# Patient Record
Sex: MALE | Race: WHITE
[De-identification: names, ages, dates, MRNs, and addresses within clinical notes are randomized per-mention and may not be internally consistent; named-entity substitution may affect disease eponyms.]

---

## 2023-03-06 ENCOUNTER — HOSPITAL ENCOUNTER (OUTPATIENT)
Facility: HOSPITAL | Age: 43
Setting detail: SPECIMEN
Discharge: HOME | End: 2023-03-09
Payer: COMMERCIAL

## 2023-03-06 PROCEDURE — 88304 TISSUE EXAM BY PATHOLOGIST: CPT

## 2023-03-06 PROCEDURE — 88311 DECALCIFY TISSUE: CPT

## 2023-03-06 PROCEDURE — 88305 TISSUE EXAM BY PATHOLOGIST: CPT

## 2024-04-24 ENCOUNTER — TELEPHONE (OUTPATIENT)
Facility: HOSPITAL | Age: 44
End: 2024-04-24

## 2024-04-26 ENCOUNTER — HOSPITAL ENCOUNTER (OUTPATIENT)
Facility: HOSPITAL | Age: 44
Setting detail: RECURRING SERIES
Discharge: HOME | End: 2024-04-29
Payer: COMMERCIAL

## 2024-04-26 PROCEDURE — 97110 THERAPEUTIC EXERCISES: CPT

## 2024-04-26 PROCEDURE — 97161 PT EVAL LOW COMPLEX 20 MIN: CPT

## 2024-04-26 PROCEDURE — 97535 SELF CARE MNGMENT TRAINING: CPT

## 2024-04-26 NOTE — PROGRESS NOTES
PHYSICAL THERAPY Upper Extremity Evaluation/Daily Note     Patient Name: Nasir Dash III    Date: 2024    : 1980  Insurance: Payor: SCHUYLER / Plan: JESS PAYAN FEDERAL / Product Type: *No Product type* /      Patient  verified yes     Visit #   Current / Total 1 16   Time   In / Out 10:52 am 11:30 am   Pain   In / Out 3 3   Subjective Functional Status/Changes: Pt is a 43 year old male presenting with c/o left forearm pain 1.5 months ago after hearing a loud pair of pops in his forearm when trying to break caliper bolts free when working on a truck. States his forearm is uncomfortable when bending his elbow, lifting anything with the left arm, typing all provoke pain. Twisting a doorknob bothers it as well. States pain is improving. States he saw orthopedics and had x-rays unremarkable. No discoloration noted at the time of injury or afterwards. States left arm still feels a little weak.   Changes to:  Meds, Allergies, Med Hx, Sx Hx?  If yes, update Summary List no       TREATMENT AREA =  Pain in left elbow [M25.522]    SUBJECTIVE  PLOF: functionally independent, no AD, semi-active lifestyle  Limitations to PLOF: limited lifting, twisting, bending of left elbow  Mechanism of Injury: see above, acute  Current symptoms/Complaints: 3/10 at best with rest; 7/10 at worst with lifting and twisting; pt reports they are able to sleep through the night secondary to pain  Previous Treatment/Compliance: ortho referral  Imaging/Diagnostic Testing: x-rays unremarkable for left arm  PMHx/Surgical Hx: ulnar nerve transposition  Work Hx: see intake  Living Situation:   Pt Goals: \"Get some exercises to help speed this\"  Barriers: []pain []financial []time []transportation []other  Motivation: appears motivated and cooperative  Substance use: []Alcohol []Tobacco []other:   Cognition: A & O x 3      Medications: none    OBJECTIVE  15 min [x]Eval                  []Re-Eval     Therapeutic Procedures:  Tx Min

## 2024-04-26 NOTE — PROGRESS NOTES
[facility]  [address] [address]  Phone: [phone]   Fax: [phone]    Plan of Care/ Statement of Necessity for Physical Therapy Services        Patient name: Nasir Dash III Start of Care: 2024   Referral source: Michael Joya MD : 1980    Medical Diagnosis: Pain in left elbow [M25.522]      Onset Date:~3/15/24    Treatment Diagnosis:  M25.522  LEFT ELBOW PAIN                                          Prior Hospitalization: see medical history Provider#: [id number]   Medications: Verified on Patient Summary List     Comorbidities:  ulnar nerve transposition   Prior Level of Function: functionally independent, no AD, semi-active lifestyle       The Plan of Care and following information is based on the information from the initial evaluation.  Assessment/ key information: Pt is a 42 yo male who presents to [facility] with c/o left medial forearm and elbow pain onset acutely while straining to move some calipers while changing his brakes at home. Pt presents with sign and symptoms consistent with leeft wrist flexor mass and supinator straing with possible distal biceps tendon involvement as well with associated pain limited weakness with elbow flexion, supination, wrist flexion, and limited AROM with elbow supination and pronation and pain with active wrist flexion with TTP along the medial wrist flexor mass just distal to the elbow. Pt will benefit from skilled PT to address their impairments and facilitate improved functional status.  Evaluation Complexity HistoryLOW Complexity : Zero comorbidities / personal factors that will impact the outcome / POC ; Examination MEDIUM Complexity : 3 Standardized tests and measures addressin body structure, function, activity limitation and / or participation in recreation  ;Presentation LOW Complexity : Stable, uncomplicated  ;Clinical Decision Making MEDIUM Complexity : FOTO score of 26-74 FOTO

## 2024-04-29 ENCOUNTER — HOSPITAL ENCOUNTER (OUTPATIENT)
Facility: HOSPITAL | Age: 44
Setting detail: RECURRING SERIES
Discharge: HOME | End: 2024-05-02
Payer: COMMERCIAL

## 2024-04-29 PROCEDURE — 97112 NEUROMUSCULAR REEDUCATION: CPT

## 2024-04-29 PROCEDURE — 97530 THERAPEUTIC ACTIVITIES: CPT

## 2024-04-29 PROCEDURE — 97110 THERAPEUTIC EXERCISES: CPT

## 2024-04-29 NOTE — PROGRESS NOTES
PHYSICAL / OCCUPATIONAL THERAPY - DAILY TREATMENT NOTE     Patient Name: Nasir Dash III    Date: 2024    : 1980  Insurance: Payor: SCHUYLER / Plan: JESS PAYAN FEDERAL / Product Type: *No Product type* /      Patient  verified Yes     Visit #   Current / Total 2 16   Time   In / Out 8:50 am 9:35 am   Pain   In / Out 0 0 \"sore\"   Subjective Functional Status/Changes: \"It feels better. I did those exercises over the weekend.\"   Changes to:  Allergies, Med Hx, Sx Hx?   yes       TREATMENT AREA =  Pain in left elbow [M25.522]    OBJECTIVE    Therapeutic Procedures:  Tx Min Billable or 1:1 Min (if diff from Tx Min) Procedure, Rationale, Specifics   29  [id number] Therapeutic Exercise (timed):  increase ROM, strength, coordination, balance, and proprioception to improve patient's ability to progress to PLOF and address remaining functional goals. (see flow sheet as applicable)    Details if applicable:       8  18878 Neuromuscular Re-Education (timed):  improve balance, coordination, kinesthetic sense, posture, core stability and proprioception to improve patient's ability to develop conscious control of individual muscles and awareness of position of extremities in order to progress to PLOF and address remaining functional goals. (see flow sheet as applicable)    Details if applicable:     8  09745 Therapeutic Activity (timed):  use of dynamic activities replicating functional movements to increase ROM, strength, coordination, balance, and proprioception in order to improve patient's ability to progress to PLOF and address remaining functional goals.  (see flow sheet as applicable)     Details if applicable:           Details if applicable:            Details if applicable:     45  [facility] Totals Reminder: bill using total billable min of TIMED therapeutic procedures (example: do not include dry needle or estim unattended, both untimed codes, in totals to left)  8-22 min = 1 unit; 23-37 min = 2 units; 38-52

## 2024-05-06 ENCOUNTER — HOSPITAL ENCOUNTER (OUTPATIENT)
Facility: HOSPITAL | Age: 44
Setting detail: RECURRING SERIES
Discharge: HOME | End: 2024-05-09
Payer: COMMERCIAL

## 2024-05-06 PROCEDURE — 97112 NEUROMUSCULAR REEDUCATION: CPT

## 2024-05-06 PROCEDURE — 97110 THERAPEUTIC EXERCISES: CPT

## 2024-05-06 PROCEDURE — 97530 THERAPEUTIC ACTIVITIES: CPT

## 2024-05-06 NOTE — PROGRESS NOTES
PHYSICAL / OCCUPATIONAL THERAPY - DAILY TREATMENT NOTE     Patient Name: Nasir Dash III    Date: 2024    : 1980  Insurance: Payor: SCHUYLER / Plan: JESS PAYAN FEDERAL / Product Type: *No Product type* /      Patient  verified Yes     Visit #   Current / Total 3 16   Time   In / Out 4:48 PM 5:30 PM   Pain   In / Out 1-2 1-2   Subjective Functional Status/Changes: Patient reports he is doing well today and stated he has been completing HEP at home   Changes to:  Allergies, Med Hx, Sx Hx?   no       TREATMENT AREA =  Pain in left elbow [M25.522]    OBJECTIVE      Therapeutic Procedures:  Tx Min Billable or 1:1 Min (if diff from Tx Min) Procedure, Rationale, Specifics   22  [id number] Therapeutic Exercise (timed):  increase ROM, strength, coordination, balance, and proprioception to improve patient's ability to progress to PLOF and address remaining functional goals. (see flow sheet as applicable)    Details if applicable:       10 10 66811 Therapeutic Activity (timed):  use of dynamic activities replicating functional movements to increase ROM, strength, coordination, balance, and proprioception in order to improve patient's ability to progress to PLOF and address remaining functional goals.  (see flow sheet as applicable)    Details if applicable:     10 10 46108 Neuromuscular Re-Education (timed):  improve balance, coordination, kinesthetic sense, posture, core stability and proprioception to improve patient's ability to develop conscious control of individual muscles and awareness of position of extremities in order to progress to PLOF and address remaining functional goals. (see flow sheet as applicable)     Details if applicable:           Details if applicable:            Details if applicable:     42 40 [facility] Totals Reminder: bill using total billable min of TIMED therapeutic procedures (example: do not include dry needle or estim unattended, both untimed codes, in totals to left)  8-22 min = 1

## 2024-05-13 ENCOUNTER — HOSPITAL ENCOUNTER (OUTPATIENT)
Facility: HOSPITAL | Age: 44
Setting detail: RECURRING SERIES
Discharge: HOME | End: 2024-05-16
Payer: COMMERCIAL

## 2024-05-13 PROCEDURE — 97112 NEUROMUSCULAR REEDUCATION: CPT

## 2024-05-13 PROCEDURE — 97110 THERAPEUTIC EXERCISES: CPT

## 2024-05-13 PROCEDURE — 97530 THERAPEUTIC ACTIVITIES: CPT

## 2024-05-13 NOTE — PROGRESS NOTES
PHYSICAL / OCCUPATIONAL THERAPY - DAILY TREATMENT NOTE     Patient Name: Nasir Dash III    Date: 2024    : 1980  Insurance: Payor: SCHUYLER / Plan: JESS PAYAN FEDERAL / Product Type: *No Product type* /      Patient  verified Yes     Visit #   Current / Total 3 16   Time   In / Out 8:50 am 9:30 am   Pain   In / Out 0 0-1   Subjective Functional Status/Changes: \"The range of motion has definitely gotten better.\" Pt notes he still has pain with terminal motions of elbow flexion and pronation/supination though improved. Notes no pain typically at rest and with most movement, though notes lifting any weight will provoke it.   Changes to:  Allergies, Med Hx, Sx Hx?   no       TREATMENT AREA =  Pain in left elbow [M25.522]    OBJECTIVE      Therapeutic Procedures:  Tx Min Billable or 1:1 Min (if diff from Tx Min) Procedure, Rationale, Specifics   20  17289 Therapeutic Exercise (timed):  increase ROM, strength, coordination, balance, and proprioception to improve patient's ability to progress to PLOF and address remaining functional goals. (see flow sheet as applicable)    Details if applicable:       10 10 80288 Therapeutic Activity (timed):  use of dynamic activities replicating functional movements to increase ROM, strength, coordination, balance, and proprioception in order to improve patient's ability to progress to PLOF and address remaining functional goals.  (see flow sheet as applicable)    Details if applicable:     10 10 64062 Neuromuscular Re-Education (timed):  improve balance, coordination, kinesthetic sense, posture, core stability and proprioception to improve patient's ability to develop conscious control of individual muscles and awareness of position of extremities in order to progress to PLOF and address remaining functional goals. (see flow sheet as applicable)     Details if applicable:           Details if applicable:            Details if applicable:     40 38 [facility] Totals